# Patient Record
Sex: MALE | Race: ASIAN | NOT HISPANIC OR LATINO
[De-identification: names, ages, dates, MRNs, and addresses within clinical notes are randomized per-mention and may not be internally consistent; named-entity substitution may affect disease eponyms.]

---

## 2022-03-21 ENCOUNTER — NON-APPOINTMENT (OUTPATIENT)
Age: 42
End: 2022-03-21

## 2022-03-22 PROBLEM — Z00.00 ENCOUNTER FOR PREVENTIVE HEALTH EXAMINATION: Status: ACTIVE | Noted: 2022-03-22

## 2022-03-25 ENCOUNTER — RESULT REVIEW (OUTPATIENT)
Age: 42
End: 2022-03-25

## 2022-03-25 ENCOUNTER — APPOINTMENT (OUTPATIENT)
Dept: PULMONOLOGY | Facility: CLINIC | Age: 42
End: 2022-03-25
Payer: COMMERCIAL

## 2022-03-25 VITALS
WEIGHT: 280 LBS | HEIGHT: 71 IN | DIASTOLIC BLOOD PRESSURE: 102 MMHG | TEMPERATURE: 96.6 F | BODY MASS INDEX: 39.2 KG/M2 | HEART RATE: 102 BPM | SYSTOLIC BLOOD PRESSURE: 149 MMHG | OXYGEN SATURATION: 95 %

## 2022-03-25 DIAGNOSIS — J45.909 UNSPECIFIED ASTHMA, UNCOMPLICATED: ICD-10-CM

## 2022-03-25 DIAGNOSIS — G47.10 HYPERSOMNIA, UNSPECIFIED: ICD-10-CM

## 2022-03-25 DIAGNOSIS — R06.02 SHORTNESS OF BREATH: ICD-10-CM

## 2022-03-25 PROCEDURE — 99205 OFFICE O/P NEW HI 60 MIN: CPT

## 2022-03-25 RX ORDER — PREDNISONE 10 MG/1
10 TABLET ORAL DAILY
Qty: 60 | Refills: 1 | Status: ACTIVE | COMMUNITY
Start: 2022-03-25 | End: 1900-01-01

## 2022-03-25 RX ORDER — FEXOFENADINE HCL AND PSEUDOEPHEDRINE HCI 60; 120 MG/1; MG/1
60-120 TABLET, EXTENDED RELEASE ORAL
Qty: 30 | Refills: 0 | Status: ACTIVE | COMMUNITY
Start: 2022-03-25 | End: 1900-01-01

## 2022-03-25 RX ORDER — AZITHROMYCIN 1 G/1
POWDER, FOR SUSPENSION ORAL
Refills: 0 | Status: ACTIVE | COMMUNITY

## 2022-03-25 RX ORDER — PREDNISONE 10 MG/1
10 TABLET ORAL
Refills: 0 | Status: ACTIVE | COMMUNITY

## 2022-03-25 RX ORDER — BUDESONIDE AND FORMOTEROL FUMARATE DIHYDRATE 160; 4.5 UG/1; UG/1
AEROSOL RESPIRATORY (INHALATION)
Refills: 0 | Status: ACTIVE | COMMUNITY

## 2022-03-25 NOTE — REASON FOR VISIT
[Initial] : an initial visit [Asthma] : asthma [Cough] : cough [Shortness of Breath] : shortness of breath [TextBox_44] : assessment. Pt states that very frequently he is experiencing chest congestion, and having difficultly getting the mucus up. Pt also reports that when in supine position he is constantly coughing causing him to sleep in an upright position, pt wakes up with dry mouth most mornings and also reports snoring. Pt BP elevated today.

## 2022-03-25 NOTE — PHYSICAL EXAM
"Initial Pulse 127   Temp 98.6  F (37  C) (Tympanic)   Resp 28   Ht 2' 7\" (0.787 m)   Wt 19 lb 15.5 oz (9.058 kg)   SpO2 100%   BMI 14.61 kg/m   Estimated body mass index is 14.61 kg/m  as calculated from the following:    Height as of this encounter: 2' 7\" (0.787 m).    Weight as of this encounter: 19 lb 15.5 oz (9.058 kg). .  Adilene Carr CMA ([facility]) 1/30/2020 1:42 PM     " [No Acute Distress] : no acute distress [Normal Oropharynx] : normal oropharynx [Normal Appearance] : normal appearance [No Neck Mass] : no neck mass [Normal Rate/Rhythm] : normal rate/rhythm [Normal S1, S2] : normal s1, s2 [No Murmurs] : no murmurs [No Resp Distress] : no resp distress [Clear to Auscultation Bilaterally] : clear to auscultation bilaterally [No Abnormalities] : no abnormalities [Benign] : benign [Normal Gait] : normal gait [No Clubbing] : no clubbing [No Cyanosis] : no cyanosis [No Edema] : no edema [FROM] : FROM [Normal Color/ Pigmentation] : normal color/ pigmentation [No Focal Deficits] : no focal deficits [Oriented x3] : oriented x3 [Normal Affect] : normal affect [TextBox_2] : Heavyset male no acute respiratory distress slight cough

## 2022-03-25 NOTE — HISTORY OF PRESENT ILLNESS
[Never] : never [TextBox_4] : 42 male no hx tobacco\par Hx asthma dx as child\par no hospitalization no ED visit   needs sterids yearly for dyspnea\par Presents today because  as child asthma worse with red drinks in [place]\par +ppt sugar and dust in [place] in [place] allergy and dust induced\par for past 5 yrs worse with seasonal allergies ppt by chest congestion and starts zyrtec and inhalers\par Today sleeping sitting up because severe chest congestion and am cough with green mucous no fever \par no hemoptysis\par no wheeze but notes coughing with speech\par precipitating factors colored drinks\par no pets \par occ computer no asbestos no chemicals\par +snore +witness apnea\par no am headache or dry mouth\par +daytime  hypersomnolent

## 2022-03-25 NOTE — DISCUSSION/SUMMARY
[FreeTextEntry1] : Mr. Alicea has symptoms of obstructive airways disease which seems precipitated by seasonal allergies and chronic cough with discolored phlegm.  Its possible he has a significant pulmonary sinusitis.  He also has symptoms symptoms consistent with obstructive sleep apnea.  I have asked him to use his Symbicort 2 sprays twice a day every day.  I have asked him to take prednisone 20 mg once a day every day.  We will be obtaining a sputum culture to see if any dominant bacteria is present.  I have asked him to obtain a chest x-ray pulmonary function tests and x-ray of the sinuses.  I have asked the patient take Allegra-D 1 tablet a day as this has antiallergy and decongestant properties.  The patient also has had a history very suggestive of obstructive sleep apnea.  I had extensive discussion with him regarding obstructive apnea and we will perform a home sleep study.  Patient understands and agrees with this plan of care.\par The patient understands and agrees with plan of care.\par Today's office visit encompassed 57  minutes. I conducted an extensive history ,physical exam and reviewed diagnosis and treatment options  including diagnostic tests,radiologic studies including cat scans  and the use of prescription medication.

## 2022-03-28 ENCOUNTER — OUTPATIENT (OUTPATIENT)
Dept: OUTPATIENT SERVICES | Facility: HOSPITAL | Age: 42
LOS: 1 days | End: 2022-03-28
Payer: COMMERCIAL

## 2022-03-28 ENCOUNTER — APPOINTMENT (OUTPATIENT)
Dept: RADIOLOGY | Facility: CLINIC | Age: 42
End: 2022-03-28
Payer: COMMERCIAL

## 2022-03-28 DIAGNOSIS — R05.3 CHRONIC COUGH: ICD-10-CM

## 2022-03-28 PROCEDURE — 70260 X-RAY EXAM OF SKULL: CPT | Mod: 26

## 2022-03-28 PROCEDURE — 70260 X-RAY EXAM OF SKULL: CPT

## 2022-03-28 PROCEDURE — 71046 X-RAY EXAM CHEST 2 VIEWS: CPT | Mod: 26

## 2022-03-28 PROCEDURE — 71046 X-RAY EXAM CHEST 2 VIEWS: CPT

## 2022-03-31 LAB — BACTERIA SPT CULT: NORMAL

## 2022-04-01 ENCOUNTER — TRANSCRIPTION ENCOUNTER (OUTPATIENT)
Age: 42
End: 2022-04-01

## 2022-04-04 ENCOUNTER — APPOINTMENT (OUTPATIENT)
Dept: PULMONOLOGY | Facility: CLINIC | Age: 42
End: 2022-04-04
Payer: COMMERCIAL

## 2022-04-04 VITALS
HEIGHT: 71 IN | OXYGEN SATURATION: 96 % | BODY MASS INDEX: 39.2 KG/M2 | DIASTOLIC BLOOD PRESSURE: 100 MMHG | SYSTOLIC BLOOD PRESSURE: 155 MMHG | WEIGHT: 280 LBS | TEMPERATURE: 97.3 F | HEART RATE: 97 BPM

## 2022-04-04 DIAGNOSIS — J45.909 UNSPECIFIED ASTHMA, UNCOMPLICATED: ICD-10-CM

## 2022-04-04 DIAGNOSIS — R05.3 CHRONIC COUGH: ICD-10-CM

## 2022-04-04 DIAGNOSIS — J30.2 OTHER SEASONAL ALLERGIC RHINITIS: ICD-10-CM

## 2022-04-04 PROCEDURE — 99214 OFFICE O/P EST MOD 30 MIN: CPT | Mod: 25

## 2022-04-04 RX ORDER — CEFUROXIME AXETIL 500 MG/1
500 TABLET ORAL
Qty: 20 | Refills: 0 | Status: ACTIVE | COMMUNITY
Start: 2022-04-04 | End: 1900-01-01

## 2022-04-04 RX ORDER — MONTELUKAST 10 MG/1
10 TABLET, FILM COATED ORAL DAILY
Qty: 30 | Refills: 6 | Status: ACTIVE | COMMUNITY
Start: 2022-04-04 | End: 1900-01-01

## 2022-04-04 NOTE — HISTORY OF PRESENT ILLNESS
[Never] : never [TextBox_4] : 42 male hx asthma for fu congestion\par overall upon awakening feels good but the more talking notes sob and cough  and chest congestion\par work at home in room no animals no mold no dust\par cxr normal\par sinus xray  normal \par sputum normal katherine\par pft mild asthma\par remains on symbicort but dced prednisone  and Allegra d\par no symptoms  gastroesophageal reflux disease\par

## 2022-04-04 NOTE — DISCUSSION/SUMMARY
[FreeTextEntry1] : Mr. Alicea has an unusual history.  He feels fine all day until he starts talking on the phone by later in the day is coughing and congested.  There is no definite sinus disease and he denies any symptoms of gastroesophageal reflux disease.  There is no definite allergens and he works from home in the room that has no new exposure.  We will continue the Symbicort and the Allegra-D.  Of asked him to start Singulair 10 mg every night.  I have also ordered allergy testing.  Lastly although his sputum with normal katherine he does cough up phlegm at the end of the day and I have asked him to use Ceftin 500 mg 1 tablet twice a day for 10 days.  Patient understands and agrees with this plan of care.\par The patient understands and agrees with plan of care.\par Today's office visit encompassed 32 minutes. I conducted an extensive history ,physical exam and reviewed diagnosis and treatment options  including diagnostic tests,radiologic studies including  cat scans  and the use of prescription medication.

## 2022-04-04 NOTE — PHYSICAL EXAM
[No Acute Distress] : no acute distress [Normal Oropharynx] : normal oropharynx [Normal Appearance] : normal appearance [No Neck Mass] : no neck mass [Normal Rate/Rhythm] : normal rate/rhythm [Normal S1, S2] : normal s1, s2 [No Murmurs] : no murmurs [No Resp Distress] : no resp distress [Clear to Auscultation Bilaterally] : clear to auscultation bilaterally [No Abnormalities] : no abnormalities [Benign] : benign [Normal Gait] : normal gait [No Clubbing] : no clubbing [No Cyanosis] : no cyanosis [No Edema] : no edema [FROM] : FROM [Normal Color/ Pigmentation] : normal color/ pigmentation [No Focal Deficits] : no focal deficits [Oriented x3] : oriented x3 [Normal Affect] : normal affect [TextBox_2] : heavy set male  no respiratory discomfort  [TextBox_68] : Minimal basilar rhonchi

## 2022-04-04 NOTE — PROCEDURE
[FreeTextEntry1] : Chest x-ray normal \par sinus x-rays normal\par Pulmonary function test revealed mild obstructive airway disease with bronchodilator response.  Severe air trapping.\par Sputum culture normal katherine.\par

## 2022-04-04 NOTE — REASON FOR VISIT
[Follow-Up] : a follow-up visit [Asthma] : asthma [Cough] : cough [TextBox_44] : Pt was here 1 week ago. PFT done. Pt states that his sx have no improved and he is still not able to lay down and sleep with out coughing constantly. Pt states that he has not continued the use of Prednisone because he began to get water retention. Pt here to discuss another plan of care. Pt BP elevated today.

## 2022-04-05 ENCOUNTER — TRANSCRIPTION ENCOUNTER (OUTPATIENT)
Age: 42
End: 2022-04-05

## 2022-04-05 RX ORDER — ALBUTEROL SULFATE 90 UG/1
108 (90 BASE) INHALANT RESPIRATORY (INHALATION)
Qty: 30 | Refills: 5 | Status: ACTIVE | COMMUNITY
Start: 2022-04-05 | End: 1900-01-01

## 2022-04-19 ENCOUNTER — TRANSCRIPTION ENCOUNTER (OUTPATIENT)
Age: 42
End: 2022-04-19

## 2022-04-19 RX ORDER — ALBUTEROL SULFATE 0.63 MG/3ML
0.63 SOLUTION RESPIRATORY (INHALATION)
Qty: 360 | Refills: 3 | Status: ACTIVE | COMMUNITY
Start: 1900-01-01 | End: 1900-01-01

## 2022-05-06 ENCOUNTER — APPOINTMENT (OUTPATIENT)
Dept: PULMONOLOGY | Facility: CLINIC | Age: 42
End: 2022-05-06

## 2022-05-19 ENCOUNTER — TRANSCRIPTION ENCOUNTER (OUTPATIENT)
Age: 42
End: 2022-05-19

## 2022-05-19 ENCOUNTER — NON-APPOINTMENT (OUTPATIENT)
Age: 42
End: 2022-05-19

## 2024-12-09 ENCOUNTER — APPOINTMENT (OUTPATIENT)
Dept: ORTHOPEDIC SURGERY | Facility: CLINIC | Age: 44
End: 2024-12-09